# Patient Record
Sex: MALE | URBAN - METROPOLITAN AREA
[De-identification: names, ages, dates, MRNs, and addresses within clinical notes are randomized per-mention and may not be internally consistent; named-entity substitution may affect disease eponyms.]

---

## 2022-07-27 ENCOUNTER — OFFICE VISIT (OUTPATIENT)
Dept: URBAN - METROPOLITAN AREA CLINIC 32 | Facility: CLINIC | Age: 82
End: 2022-07-27
Payer: MEDICARE

## 2022-07-27 DIAGNOSIS — H35.371 PUCKERING OF MACULA, RIGHT EYE: Primary | ICD-10-CM

## 2022-07-27 PROCEDURE — 99204 OFFICE O/P NEW MOD 45 MIN: CPT | Performed by: OPTOMETRIST

## 2022-07-27 PROCEDURE — 92134 CPTRZ OPH DX IMG PST SGM RTA: CPT | Performed by: OPTOMETRIST

## 2022-07-27 ASSESSMENT — KERATOMETRY
OD: 44.50
OS: 44.88

## 2022-07-27 ASSESSMENT — INTRAOCULAR PRESSURE
OS: 16
OD: 15

## 2022-07-27 NOTE — IMPRESSION/PLAN
Impression: Puckering of macula, right eye: H35.371. Plan: Discussed diagnosis in detail with patient. Discussed treatment options with patient. MAC-OCT ordered and reviewed with patient.  Recommend consult with retina specialist

## 2022-08-08 ENCOUNTER — OFFICE VISIT (OUTPATIENT)
Dept: URBAN - METROPOLITAN AREA CLINIC 32 | Facility: CLINIC | Age: 82
End: 2022-08-08
Payer: MEDICARE

## 2022-08-08 DIAGNOSIS — H35.371 PUCKERING OF MACULA, RIGHT EYE: Primary | ICD-10-CM

## 2022-08-08 DIAGNOSIS — H26.492 OTHER SECONDARY CATARACT, LEFT EYE: ICD-10-CM

## 2022-08-08 PROCEDURE — 92134 CPTRZ OPH DX IMG PST SGM RTA: CPT | Performed by: OPHTHALMOLOGY

## 2022-08-08 PROCEDURE — 99204 OFFICE O/P NEW MOD 45 MIN: CPT | Performed by: OPHTHALMOLOGY

## 2022-08-08 RX ORDER — PREDNISOLONE ACETATE 10 MG/ML
1 % SUSPENSION/ DROPS OPHTHALMIC
Qty: 10 | Refills: 1 | Status: ACTIVE
Start: 2022-08-08

## 2022-08-08 RX ORDER — OFLOXACIN 3 MG/ML
0.3 % SOLUTION/ DROPS OPHTHALMIC
Qty: 5 | Refills: 3 | Status: ACTIVE
Start: 2022-08-08

## 2022-08-08 ASSESSMENT — INTRAOCULAR PRESSURE
OS: 12
OD: 13

## 2022-08-08 NOTE — IMPRESSION/PLAN
Impression: Puckering of macula, right eye: H35.371. Right. Condition: new problem addtl w/u needed. Vision: vision affected. hx of Repair of RD OD in the past Plan: Discussed diagnosis in detail with patient. Discussed risks of progression. Surgical treatment is recommended to remove scar tissue for vision improvement PPVx. Surgical risks and benefits were discussed, explained and understood by patient. All questions answered. RL1. Patient elects to proceed with recommendation. OCT performed today: ERM. Educational material provided to patient. Erx Prednisolone and Ofloxacin to patient's pharmacy.

## 2022-08-08 NOTE — IMPRESSION/PLAN
Impression: Other secondary cataract, left eye: H26.492. Left. Condition: new prob, no addtl w/u needed. Vision: vision not affected. Plan: Discussed diagnosis in detail with patient. No treatment is required at this time. May need Yag laser tx OS in the future. Will continue to observe condition and or symptoms.

## 2023-10-19 ENCOUNTER — OFFICE VISIT (OUTPATIENT)
Dept: URBAN - METROPOLITAN AREA CLINIC 32 | Facility: CLINIC | Age: 83
End: 2023-10-19
Payer: MEDICARE

## 2023-10-19 DIAGNOSIS — H26.492 OTHER SECONDARY CATARACT, LEFT EYE: ICD-10-CM

## 2023-10-19 DIAGNOSIS — H35.371 PUCKERING OF MACULA, RIGHT EYE: Primary | ICD-10-CM

## 2023-10-19 PROCEDURE — 92134 CPTRZ OPH DX IMG PST SGM RTA: CPT

## 2023-10-19 PROCEDURE — 99214 OFFICE O/P EST MOD 30 MIN: CPT

## 2023-10-19 ASSESSMENT — INTRAOCULAR PRESSURE
OD: 12
OS: 12

## 2023-10-19 ASSESSMENT — VISUAL ACUITY
OD: 20/30
OS: 20/30

## 2024-09-26 ENCOUNTER — OFFICE VISIT (OUTPATIENT)
Dept: URBAN - METROPOLITAN AREA CLINIC 33 | Facility: CLINIC | Age: 84
End: 2024-09-26
Payer: MEDICARE

## 2024-09-26 DIAGNOSIS — H35.371 PUCKERING OF MACULA, RIGHT EYE: Primary | ICD-10-CM

## 2024-09-26 PROCEDURE — 92134 CPTRZ OPH DX IMG PST SGM RTA: CPT | Performed by: OPHTHALMOLOGY

## 2024-09-26 PROCEDURE — 99214 OFFICE O/P EST MOD 30 MIN: CPT | Performed by: OPHTHALMOLOGY

## 2024-09-26 RX ORDER — PREDNISOLONE ACETATE 10 MG/ML
1 % SUSPENSION/ DROPS OPHTHALMIC
Qty: 10 | Refills: 1 | Status: ACTIVE
Start: 2024-09-26

## 2024-09-26 RX ORDER — OFLOXACIN 3 MG/ML
0.3 % SOLUTION/ DROPS OPHTHALMIC
Qty: 5 | Refills: 1 | Status: ACTIVE
Start: 2024-09-26

## 2024-09-26 ASSESSMENT — INTRAOCULAR PRESSURE
OS: 11
OD: 11

## 2024-10-30 ENCOUNTER — SURGERY (OUTPATIENT)
Dept: URBAN - METROPOLITAN AREA SURGERY 15 | Facility: SURGERY | Age: 84
End: 2024-10-30
Payer: MEDICARE

## 2024-10-30 PROCEDURE — 67042 VIT FOR MACULAR HOLE: CPT | Performed by: OPHTHALMOLOGY

## 2024-10-31 ENCOUNTER — POST-OPERATIVE VISIT (OUTPATIENT)
Dept: URBAN - METROPOLITAN AREA CLINIC 32 | Facility: CLINIC | Age: 84
End: 2024-10-31
Payer: MEDICARE

## 2024-10-31 DIAGNOSIS — Z48.810 ENCOUNTER FOR SURGICAL AFTERCARE FOLLOWING SURGERY ON A SENSE ORGAN: Primary | ICD-10-CM

## 2024-10-31 PROCEDURE — 99024 POSTOP FOLLOW-UP VISIT: CPT | Performed by: OPTOMETRIST

## 2024-10-31 ASSESSMENT — INTRAOCULAR PRESSURE
OS: 8
OD: 8

## 2024-11-07 ENCOUNTER — POST-OPERATIVE VISIT (OUTPATIENT)
Dept: URBAN - METROPOLITAN AREA CLINIC 33 | Facility: CLINIC | Age: 84
End: 2024-11-07
Payer: MEDICARE

## 2024-11-07 DIAGNOSIS — Z48.810 ENCOUNTER FOR SURGICAL AFTERCARE FOLLOWING SURGERY ON A SENSE ORGAN: Primary | ICD-10-CM

## 2024-11-07 PROCEDURE — 99024 POSTOP FOLLOW-UP VISIT: CPT | Performed by: OPHTHALMOLOGY

## 2024-11-07 ASSESSMENT — INTRAOCULAR PRESSURE
OD: 15
OS: 13

## 2024-12-05 ENCOUNTER — OFFICE VISIT (OUTPATIENT)
Dept: URBAN - METROPOLITAN AREA CLINIC 32 | Facility: CLINIC | Age: 84
End: 2024-12-05
Payer: MEDICARE

## 2024-12-05 ENCOUNTER — POST-OPERATIVE VISIT (OUTPATIENT)
Dept: URBAN - METROPOLITAN AREA CLINIC 33 | Facility: CLINIC | Age: 84
End: 2024-12-05
Payer: MEDICARE

## 2024-12-05 DIAGNOSIS — Z48.810 ENCOUNTER FOR SURGICAL AFTERCARE FOLLOWING SURGERY ON A SENSE ORGAN: Primary | ICD-10-CM

## 2024-12-05 DIAGNOSIS — H52.4 PRESBYOPIA: ICD-10-CM

## 2024-12-05 PROCEDURE — 99024 POSTOP FOLLOW-UP VISIT: CPT | Performed by: OPHTHALMOLOGY

## 2024-12-05 PROCEDURE — 99214 OFFICE O/P EST MOD 30 MIN: CPT | Performed by: OPTOMETRIST

## 2024-12-05 ASSESSMENT — INTRAOCULAR PRESSURE
OS: 11
OD: 13
OD: 12
OD: 13
OS: 12
OS: 12
OS: 11
OD: 12

## 2024-12-05 ASSESSMENT — VISUAL ACUITY
OS: 20/30
OD: 20/60

## 2024-12-05 ASSESSMENT — KERATOMETRY
OS: 44.38
OD: 44.63